# Patient Record
Sex: FEMALE | Race: OTHER | NOT HISPANIC OR LATINO | URBAN - METROPOLITAN AREA
[De-identification: names, ages, dates, MRNs, and addresses within clinical notes are randomized per-mention and may not be internally consistent; named-entity substitution may affect disease eponyms.]

---

## 2021-08-24 ENCOUNTER — EMERGENCY (EMERGENCY)
Facility: HOSPITAL | Age: 26
LOS: 1 days | Discharge: HOME | End: 2021-08-24
Attending: EMERGENCY MEDICINE | Admitting: EMERGENCY MEDICINE
Payer: COMMERCIAL

## 2021-08-24 VITALS
OXYGEN SATURATION: 98 % | HEIGHT: 67 IN | HEART RATE: 106 BPM | TEMPERATURE: 98 F | RESPIRATION RATE: 18 BRPM | WEIGHT: 149.91 LBS | SYSTOLIC BLOOD PRESSURE: 140 MMHG | DIASTOLIC BLOOD PRESSURE: 87 MMHG

## 2021-08-24 VITALS — HEART RATE: 99 BPM

## 2021-08-24 DIAGNOSIS — R06.02 SHORTNESS OF BREATH: ICD-10-CM

## 2021-08-24 DIAGNOSIS — F41.9 ANXIETY DISORDER, UNSPECIFIED: ICD-10-CM

## 2021-08-24 DIAGNOSIS — R42 DIZZINESS AND GIDDINESS: ICD-10-CM

## 2021-08-24 PROCEDURE — 99283 EMERGENCY DEPT VISIT LOW MDM: CPT

## 2021-08-24 PROCEDURE — 93010 ELECTROCARDIOGRAM REPORT: CPT

## 2021-08-24 PROCEDURE — 99284 EMERGENCY DEPT VISIT MOD MDM: CPT

## 2021-08-24 PROCEDURE — 93005 ELECTROCARDIOGRAM TRACING: CPT

## 2021-08-24 NOTE — ED PROVIDER NOTE - PATIENT PORTAL LINK FT
You can access the FollowMyHealth Patient Portal offered by [facility] by registering at the following website: [email or web address]. By joining [facility]’s FollowMyHealth portal, you will also be able to view your health information using other applications (apps) compatible with our system.

## 2021-08-24 NOTE — ED PROVIDER NOTE - PROGRESS NOTE DETAILS
[place]: asymptomatic. Clinically no indication for further emergent ED workup or hospitalization at this time. Comfortable for dc, outpt f/u.

## 2021-08-24 NOTE — ED PROVIDER NOTE - OBJECTIVE STATEMENT
26F no PMH p/w several complaints. Was feeling like usual self, laying in bed trying to sleep. She developed sensation of "forgetting how to breathe," followed by b/l UE tingling and mild lightheadedness and feeling like there were bubbles in her heart. Now feeling much better. Similar episode ~1w ago, went to outside hospital where labs reportedly were wnl. Uses occasional MJ.   Denies associated CP, nausea, vomiting, diarrhea, diaphoresis, cough, rhinorrhea, black stool, bloody stool, LE pain, LE swelling, focal weakness/numbness, recent travel/immobilization, abd pain, urinary complaints, f/c. No hormone use.

## 2021-08-24 NOTE — ED ADULT NURSE NOTE - OBJECTIVE STATEMENT
Received ambulatory with steady gait. Patient reports 2 hrs PTA, while trying to sleep patient felt like she forgot to breath, feeling "electric shock" to rest of body. Patient reports similar symptoms happened a week ago. Symptoms mentioned above resolved PTA and stated "can you give me medication to sleep."    AOX4, speaking full sentences.  +PERRLA.  Patient denies chest pain, shortness of breath, difficulty breathing and any form of distress not noted.  Resps even and nonlabored. Moves all extremities. No obvious trauma/injury/deformity noted. Patient oriented to ED area. All needs attended. POC reviewed. Purposeful proactive hourly rounding in progress.

## 2021-08-24 NOTE — ED PROVIDER NOTE - CPE EDP RESP NORM
Received fax from [facility] stating pt is currently taking Atenolol 50 mg, 1 tablet daily.  There is a nationwide shortage of Atenolol, requesting a alternative.   normal...

## 2021-08-24 NOTE — ED PROVIDER NOTE - NSFOLLOWUPINSTRUCTIONS_ED_ALL_ED_FT
It is unclear what exactly is causing your symptoms! It is important to continue following up with your doctor outside the hospital and to return to ER for: Persistent fever/vomiting, uncontrolled pain, worsening swelling, worsening breathing, worsening lightheaded, spreading redness.     Stay well hydrated.    Return for fevers, persistent vomit, uncontrolled pain, worsening breathing, worsening lightheaded.    Follow up with primary doctor within 1-2 days.     Shortness of breath    Shortness of breath (dyspnea) means you have trouble breathing and could indicate a medical problem. Causes include lung disease, heart disease, low amount of red blood cells (anemia), poor physical fitness, being overweight, smoking, etc. Your health care provider today may not be able to find a cause for your shortness of breath after your exam. In this case, it is important to have a follow-up exam with your primary care physician as instructed. If medicines were prescribed, take them as directed for the full length of time directed. Refrain from tobacco products.    SEEK IMMEDIATE MEDICAL CARE IF YOU HAVE ANY OF THE FOLLOWING SYMPTOMS: worsening shortness of breath, chest pain, back pain, abdominal pain, fever, coughing up blood, lightheadedness/dizziness.     Follow up with a primary doctor:    Dr. Desirae Sherman, Dr. Oli Thomas, Dr. Calero   [address]  [phone]    [facility] at [address] [place] [address]  Dr Gallagher, Dr. Skelton, Dr. Collins, Dr. Ford, DeJacqui Sterling  [phone]    Dr. Coleen Ann   [address]  [phone]    Dr. Rossana Victor, Dr. Yris Escobar, Dr. Alexx Gracia, Dr. Winters [facility]  [address]  [phone]    Dr. Wojciech Multani  [address]  [phone]    Dr. Shiloh Farley  [address]  [phone]    Dr. Rossana Melgar, Dr. Rogerio Holden, Dr. Tim Thomas  [address]  [phone]

## 2021-08-24 NOTE — ED ADULT NURSE NOTE - CHIEF COMPLAINT QUOTE
pt reports 2 hrs PTA as she was trying to fall asleep she felt rapid heart beat, bilateral hand tingling and anxious. Pt reports symptoms improving since arrival. Pt states "I just need help sleeping."
